# Patient Record
Sex: FEMALE | Race: WHITE | NOT HISPANIC OR LATINO | Employment: STUDENT | URBAN - METROPOLITAN AREA
[De-identification: names, ages, dates, MRNs, and addresses within clinical notes are randomized per-mention and may not be internally consistent; named-entity substitution may affect disease eponyms.]

---

## 2018-01-12 NOTE — CONSULTS
Therapy  Rehabilitation Services Referral: Therapy Location: [facility]  Patient Status: post-operative  Diagnosis: Right knee ACL rupture - surgery 4/1/2016  Rehabilitation Services: evaluate and treat patient as needed and initiate a home exercise program  Exercise/Treatment: AROM/PROM, stretching, knee and strengthening/PRE  Precautions/Comments: Follow ACL reconstruction protocol  thanks  Frequency: 3 times per week, for 4 weeks  Please send progress report  I hereby certify that the services indicated above are medically necessary        Signatures   Electronically signed by : Sloane Teixeira, [facility]; Mar 31 2016  1:28PM EST                       (Author)

## 2018-01-16 NOTE — CONSULTS
Therapy  Rehabilitation Services Referral:   Patient Status: post-operative  Diagnosis: R ACL reconstruction with hamstring autograft (4/1/2016)  Rehabilitation Services: evaluate and treat patient as needed and initiate a home exercise program  Modalities: FOllow ACL protocol  Exercise/Treatment: AROM/PROM, stretching, knee and strengthening/PRE  Precautions/Comments: Follow ACL protocol  thank you  Frequency: 2 times per week, for 8 weeks  Please send progress report  I hereby certify that the services indicated above are medically necessary        Signatures   Electronically signed by : Ivanna Templeton, [facility]; May  3 2016  6:29AM EST                       (Author)

## 2018-01-16 NOTE — RESULT NOTES
Verified Results  * CT ABDOMEN PELVIS W CONTRAST [id number] 10:51AM Olivia Mejias     Test Name Result Flag Reference   CT ABDOMEN PELVIS W CONTRAST (Report)     CT ABDOMEN AND PELVIS WITH IV CONTRAST     INDICATION: Struck in abdomen today while practicing lacrosse, upper abdominal pain, worse on inspiration  Infectious mononucleosis  COMPARISON: None  TECHNIQUE: CT examination of the abdomen and pelvis was performed after the administration of intravenous contrast  This examination, like all CT scans performed in the [facility], was performed utilizing techniques to minimize    radiation dose exposure, including the use of iterative reconstruction and automated exposure control  Axial, sagittal, and coronal reformatted images were submitted for interpretation  100 cc of intravenous Omnipaque 350 was administered for this    examination  Enteric contrast was not given  FINDINGS:     ABDOMEN     LOWER CHEST: No significant abnormalities identified in the lower chest      LIVER/BILIARY TREE: Unremarkable  GALLBLADDER: No calcified gallstones  No pericholecystic inflammatory change  SPLEEN: Unremarkable  PANCREAS: Unremarkable  ADRENAL GLANDS: Unremarkable  KIDNEYS/URETERS: Unremarkable  No hydronephrosis  STOMACH AND BOWEL: Unremarkable  APPENDIX: No findings to suggest appendicitis  ABDOMINOPELVIC CAVITY: There is a small volume of free pelvic fluid, likely physiologic given the patient's age and gender  No free intraperitoneal air  No lymphadenopathy  VESSELS: Unremarkable for patient's age  PELVIS     REPRODUCTIVE ORGANS: Unremarkable for patient's age  URINARY BLADDER: Unremarkable  ABDOMINAL WALL/INGUINAL REGIONS: Unremarkable  OSSEOUS STRUCTURES: No acute fracture or destructive osseous lesion  IMPRESSION:     Unremarkable examination of the abdomen and pelvis         Workstation performed: [id number]     Signed by: Angelica Cortes MD   2/19/16

## 2018-11-19 ENCOUNTER — HOSPITAL ENCOUNTER (EMERGENCY)
Facility: HOSPITAL | Age: 23
Discharge: HOME | End: 2018-11-19
Attending: EMERGENCY MEDICINE | Admitting: EMERGENCY MEDICINE
Payer: COMMERCIAL

## 2018-11-19 VITALS
WEIGHT: 154 LBS | HEIGHT: 65 IN | DIASTOLIC BLOOD PRESSURE: 56 MMHG | OXYGEN SATURATION: 100 % | RESPIRATION RATE: 17 BRPM | TEMPERATURE: 98.6 F | BODY MASS INDEX: 25.66 KG/M2 | SYSTOLIC BLOOD PRESSURE: 116 MMHG | HEART RATE: 87 BPM

## 2018-11-19 DIAGNOSIS — N94.9 VAGINAL DISCOMFORT: Primary | ICD-10-CM

## 2018-11-19 LAB
BACTERIA UR QL AUTO: ABNORMAL /HPF
BILIRUB UR QL STRIP: NEGATIVE
CLARITY UR: CLEAR
COLOR UR: YELLOW
COLOR, POC: YELLOW
EXT PREG TEST URINE: NEGATIVE
GLUCOSE UR STRIP-MCNC: NEGATIVE MG/DL
HGB UR QL STRIP.AUTO: ABNORMAL
HYALINE CASTS #/AREA URNS LPF: ABNORMAL /LPF
KETONES UR STRIP-MCNC: NEGATIVE MG/DL
LEUKOCYTE ESTERASE UR QL STRIP: ABNORMAL
NITRITE UR QL STRIP: NEGATIVE
NON-SQ EPI CELLS URNS QL MICRO: ABNORMAL /HPF
PH UR STRIP.AUTO: 5.5 [PH] (ref 4.5–8)
PROT UR STRIP-MCNC: NEGATIVE MG/DL
RBC #/AREA URNS AUTO: ABNORMAL /HPF
SP GR UR STRIP.AUTO: 1.01 (ref 1–1.03)
UROBILINOGEN UR QL STRIP.AUTO: 0.2 E.U./DL
WBC #/AREA URNS AUTO: ABNORMAL /HPF

## 2018-11-19 PROCEDURE — 87491 CHLMYD TRACH DNA AMP PROBE: CPT | Performed by: EMERGENCY MEDICINE

## 2018-11-19 PROCEDURE — 81001 URINALYSIS AUTO W/SCOPE: CPT

## 2018-11-19 PROCEDURE — 87591 N.GONORRHOEAE DNA AMP PROB: CPT | Performed by: EMERGENCY MEDICINE

## 2018-11-19 PROCEDURE — 81025 URINE PREGNANCY TEST: CPT | Performed by: EMERGENCY MEDICINE

## 2018-11-19 PROCEDURE — 99283 EMERGENCY DEPT VISIT LOW MDM: CPT

## 2018-11-19 NOTE — ED ATTENDING ATTESTATION
Paul Keith MD, saw and evaluated the patient  I have discussed the patient with the resident/non-physician practitioner and agree with the resident's/non-physician practitioner's findings, Plan of Care, and MDM as documented in the resident's/non-physician practitioner's note, except where noted  All available labs and Radiology studies were reviewed  At this point I agree with the current assessment done in the Emergency Department    I have conducted an independent evaluation of this patient a history and physical is as follows:    Pt feels she may have left a tampon in her vagina since Thursday no discharge some discomfort Pt is sexually active PE: alert nad nontender present for pelvic no fb small amount of white plaques to wall of vagina  MDM: will treat yeast no fb found  Critical Care Time  CritCare Time    Procedures

## 2018-11-19 NOTE — ED PROVIDER NOTES
History  Chief Complaint   Patient presents with    Foreign Body in Vagina     patient reports she put a tampon in 2 days ago and can't remember if she took it out or not     HPI  The 43-year-old woman comes in for evaluation of concern for tampon in her vagina  Patient place tampon approximately 3 days ago  She does not remember taking now  Since that time she has had sexual intercourse with her male partner hand noted discomfort but denies pain  Denies pain with defecation or pain with urination  Denies any vaginal odor or vaginal discharge or vaginal itching  Patient denies fevers chills sweats, denies any nausea vomiting or abdominal     Exam:  With female attending provider as standby, pelvic exam was done  External genitalia was normal, no cervical motion tenderness, no adnexal fullness or tenderness, no foreign body or tampon was appreciated  Small little areas of white which could be yeast, but no cottage cheese discharge  MDM:  Patient comes in evaluation of foreign body in her vagina  This was negative  Will evaluate for UA and urine pregnancy in the setting of discomfort  Will send a cervical GC probe and discharge  Given wet could be yeast, will defer treatment at this time since it is not bad but will encourage patient to get over the counter antifungals symptoms continue  Prior to Admission Medications   Prescriptions Last Dose Informant Patient Reported? Taking? NON FORMULARY   Yes No   Sig: Take by mouth daily  Pt takes  a birth control pill but does not know the name of it   albuterol (2 5 mg/3 mL) 0 083 % nebulizer solution   Yes No   Sig: Take 2 5 mg by nebulization every 6 (six) hours as needed for wheezing  oxyCODONE-acetaminophen (PERCOCET) 5-325 mg per tablet   No No   Si-2 tablets PO Q 4-6 hours as needed for severe pain      Facility-Administered Medications: None       History reviewed  No pertinent past medical history      Past Surgical History:   Procedure Laterality Date    NOSE SURGERY      [place] KNEE SCOPE,AID ANT CRUCIATE REPAIR Right 4/1/2016    Procedure: ARTHROSCOPIC ASSISTED RECONSTRUCTION ANTERIOR CRUCIATE LIGAMENT (ACL) WITH HAMSTRING AUTOGRAFT;  Surgeon: Rosemarie Lawler MD;  Location: [facility];  Service: Orthopedics       History reviewed  No pertinent family history  I have reviewed and agree with the history as documented  Social History   Substance Use Topics    Smoking status: Never Smoker    Smokeless tobacco: Never Used    Alcohol use Yes      Comment: occasional        Review of Systems   Constitutional: Negative  HENT: Negative  Eyes: Negative  Respiratory: Negative  Cardiovascular: Negative  Gastrointestinal: Negative  Endocrine: Negative  Genitourinary:        Vaginal discomfort   Musculoskeletal: Negative  Skin: Negative  Allergic/Immunologic: Negative  Neurological: Negative  Hematological: Negative  Psychiatric/Behavioral: Negative  Physical Exam  ED Triage Vitals [11/19/18 1459]   Temperature Pulse Respirations Blood Pressure SpO2   98 6 °F (37 °C) 87 17 116/56 100 %      Temp Source Heart Rate Source Patient Position - Orthostatic VS BP Location FiO2 (%)   Oral Monitor Lying Right arm --      Pain Score       No Pain           Orthostatic Vital Signs  Vitals:    11/19/18 1459   BP: 116/56   Pulse: 87   Patient Position - Orthostatic VS: Lying       Physical Exam   Constitutional: She is oriented to person, place, and time  She appears well-developed and well-nourished  No distress  HENT:   Head: Normocephalic and atraumatic  Right Ear: External ear normal    Left Ear: External ear normal    Mouth/Throat: Oropharynx is clear and moist    Eyes: Pupils are equal, round, and reactive to light  Conjunctivae and EOM are normal  Right eye exhibits no discharge  Left eye exhibits no discharge  No scleral icterus  Neck: Normal range of motion  Neck supple  No tracheal deviation present   No thyromegaly present  Cardiovascular: Normal rate, regular rhythm and intact distal pulses  Exam reveals no gallop and no friction rub  No murmur heard  Pulmonary/Chest: Effort normal and breath sounds normal  No stridor  No respiratory distress  She has no wheezes  She has no rales  Abdominal: Soft  Bowel sounds are normal  She exhibits no distension  There is no tenderness  There is no rebound and no guarding  Genitourinary: Vagina normal  No vaginal discharge found  Genitourinary Comments: No external lesion on the vagina  No vaginal discharge appreciated  On speculum exam, no tampon was seen within the vagina  Cervical os was unremarkable  Bimanual exam showed no cervical motion tenderness, no adnexal tenderness  There were small areas of whiteness that could be yeast however no cottage cheese discharge  Musculoskeletal: Normal range of motion  She exhibits no edema or deformity  Neurological: She is alert and oriented to person, place, and time  No cranial nerve deficit  Skin: Skin is warm and dry  No rash noted  She is not diaphoretic  No erythema  Psychiatric: She has a normal mood and affect  Her behavior is normal  Thought content normal    Nursing note and vitals reviewed  ED Medications  Medications - No data to display    Diagnostic Studies  Results Reviewed     Procedure Component Value Units Date/Time    Urine Microscopic [[id number]]  (Abnormal) Collected:  11/19/18 1638    Lab Status:  Final result Specimen:  Urine from Urine, Clean Catch Updated:  11/19/18 1714     RBC, UA None Seen /hpf      WBC, UA 2-4 (A) /hpf      Epithelial Cells None Seen /hpf      Bacteria, UA None Seen /hpf      Hyaline Casts, UA None Seen /lpf     Chlamydia/GC amplified DNA by PCR [[id number]] Collected:  11/19/18 1634    Lab Status:   In process Specimen:  Genital from Cervix Updated:  11/19/18 1645    POCT urinalysis dipstick [[id number]]  (Normal) Resulted:  11/19/18 1642    Lab Status:  Final result Specimen:  Urine Updated:  11/19/18 1643     Color, UA yellow    POCT pregnancy, urine [[id number]]  (Normal) Resulted:  11/19/18 1642    Lab Status:  Final result Updated:  11/19/18 1642     EXT PREG TEST UR (Ref: Negative) negative    ED Urine Macroscopic [[id number]]  (Abnormal) Collected:  11/19/18 1638    Lab Status:  Final result Specimen:  Urine Updated:  11/19/18 1638     Color, UA Yellow     Clarity, UA Clear     pH, UA 5 5     Leukocytes, UA Trace (A)     Nitrite, UA Negative     Protein, UA Negative mg/dl      Glucose, UA Negative mg/dl      Ketones, UA Negative mg/dl      Urobilinogen, UA 0 2 E U /dl      Bilirubin, UA Negative     Blood, UA Moderate (A)     Specific [facility], UA 1 015    Narrative:       CLINITEK RESULT                 No orders to display         Procedures  Procedures      Phone Consults  ED Phone Contact    ED Course      discussed results of workup with the patient  If GC chlamydia is positive, patient recall  The patient continues has symptoms, she to over-the-counter yeast infection treatment otherwise return precautions were discussed  Patient was discharged  [facility]  CritCare Time    Disposition  Final diagnoses:   Vaginal discomfort     Time reflects when diagnosis was documented in both MDM as applicable and the Disposition within this note     Time User Action Codes Description Comment    11/19/2018  5:26 PM Nany Hare Add [N89 9] Vaginal disorder     11/19/2018  5:26 PM Nany Hare Remove [N89 9] Vaginal disorder     11/19/2018  5:27 PM Nany Hare Add [N94 9] Vaginal discomfort       ED Disposition     ED Disposition Condition Comment    Discharge  Nan Dust discharge to home/self care      Condition at discharge: Stable        Follow-up Information     Follow up With Specialties Details Why [facility] Emergency Department Emergency Medicine Go to As needed, If symptoms worsen [address] [facility] ED, [address], [place] [address] Obstetrics and Gynecology Schedule an appointment as soon as possible for a visit in 1 week To follow up being seen in the emergency department [address] [phone]  [facility] [address], [phone]          Discharge Medication List as of 11/19/2018  5:29 PM      CONTINUE these medications which have NOT CHANGED    Details   albuterol (2 5 mg/3 mL) 0 083 % nebulizer solution Take 2 5 mg by nebulization every 6 (six) hours as needed for wheezing , Historical Med      NON FORMULARY Take by mouth daily  Pt takes  a birth control pill but does not know the name of it, Historical Med      oxyCODONE-acetaminophen (PERCOCET) 5-325 mg per tablet 1-2 tablets PO Q 4-6 hours as needed for severe pain, Print           No discharge procedures on file  ED Provider  Attending physically available and evaluated Georgi Jc I managed the patient along with the ED Attending      Electronically Signed by         Gifty Hickey MD  11/20/18 0010

## 2018-11-19 NOTE — DISCHARGE INSTRUCTIONS
Your urine was unremarkable  He did not have a urinary tract infection, you are not pregnant  The gonorrhea chlamydia test will be sent off to lab  Will call you if it is positive  You can use over-the-counter antifungals such as clotrimazole or miconazole if you have symptoms of itching or increasing discomfort  Please follow-up with the OBGYN  Their phone numbers provided  Please return emergency department have any changes symptoms worsening symptoms or any other concerns

## 2018-11-20 LAB
C TRACH DNA SPEC QL NAA+PROBE: NEGATIVE
N GONORRHOEA DNA SPEC QL NAA+PROBE: NEGATIVE